# Patient Record
Sex: MALE | Race: WHITE | NOT HISPANIC OR LATINO | Employment: OTHER
[De-identification: names, ages, dates, MRNs, and addresses within clinical notes are randomized per-mention and may not be internally consistent; named-entity substitution may affect disease eponyms.]

---

## 2019-12-08 ENCOUNTER — HEALTH MAINTENANCE LETTER (OUTPATIENT)
Age: 74
End: 2019-12-08

## 2020-03-15 ENCOUNTER — HEALTH MAINTENANCE LETTER (OUTPATIENT)
Age: 75
End: 2020-03-15

## 2021-01-10 ENCOUNTER — HEALTH MAINTENANCE LETTER (OUTPATIENT)
Age: 76
End: 2021-01-10

## 2021-05-08 ENCOUNTER — HEALTH MAINTENANCE LETTER (OUTPATIENT)
Age: 76
End: 2021-05-08

## 2021-10-23 ENCOUNTER — HEALTH MAINTENANCE LETTER (OUTPATIENT)
Age: 76
End: 2021-10-23

## 2022-06-04 ENCOUNTER — HEALTH MAINTENANCE LETTER (OUTPATIENT)
Age: 77
End: 2022-06-04

## 2022-10-10 ENCOUNTER — HEALTH MAINTENANCE LETTER (OUTPATIENT)
Age: 77
End: 2022-10-10

## 2022-11-28 ENCOUNTER — OFFICE VISIT (OUTPATIENT)
Dept: CARDIOLOGY | Facility: CLINIC | Age: 77
End: 2022-11-28
Payer: MEDICARE

## 2022-11-28 VITALS
HEIGHT: 70 IN | OXYGEN SATURATION: 98 % | HEART RATE: 70 BPM | WEIGHT: 156.5 LBS | DIASTOLIC BLOOD PRESSURE: 80 MMHG | SYSTOLIC BLOOD PRESSURE: 118 MMHG | BODY MASS INDEX: 22.41 KG/M2

## 2022-11-28 DIAGNOSIS — Z13.6 SCREENING FOR CARDIOVASCULAR CONDITION: Primary | ICD-10-CM

## 2022-11-28 PROCEDURE — 93000 ELECTROCARDIOGRAM COMPLETE: CPT | Performed by: INTERNAL MEDICINE

## 2022-11-28 PROCEDURE — 99204 OFFICE O/P NEW MOD 45 MIN: CPT | Performed by: INTERNAL MEDICINE

## 2022-11-28 RX ORDER — LATANOPROST 50 UG/ML
SOLUTION/ DROPS OPHTHALMIC
COMMUNITY
Start: 2022-10-26

## 2022-11-28 RX ORDER — ASPIRIN 81 MG/1
81 TABLET, CHEWABLE ORAL DAILY
COMMUNITY

## 2022-11-28 RX ORDER — SIMVASTATIN 40 MG
40 TABLET ORAL DAILY
COMMUNITY
Start: 2022-10-26

## 2022-11-28 RX ORDER — TRIAMTERENE AND HYDROCHLOROTHIAZIDE 37.5; 25 MG/1; MG/1
CAPSULE ORAL
COMMUNITY
Start: 2022-11-17 | End: 2024-04-18

## 2022-11-28 RX ORDER — ALPRAZOLAM 0.5 MG
TABLET ORAL
COMMUNITY
Start: 2022-11-18 | End: 2024-04-18

## 2022-11-28 NOTE — LETTER
11/28/2022    Willie Dunne MD  [facility] [address] [facility] [address]    RE: Corky Zacarias       Dear Colleague,     I had the pleasure of seeing Corky Zacarias in the [facility].  CARDIOLOGY CLINIC     REASON FOR VISIT: re-establish CV care, h/o CAD    PRIMARY CARE PHYSICIAN:  Willie Dunne    HISTORY OF PRESENT ILLNESS:    Corky Zacarias is a very nice 77 year old gentleman w/CAD h/o PCI diag 2009 here to re-establish CV care.  He has not been seen in cardiology clinic since 2015.  He lives in [facility] and has followed with primary care physician with [facility] there.    Usually his BP is around 135. Recently it has been higher, in the 150s range and even up to 170s. He went to the ED up [place] in [place] where he lives and reports BP was 180/90.    He gets anxious and checks his blood pressure very frequently.  When blood pressure was that high he reported jaw discomfort and he also has intermittent Bilateral wrist discomfort although that is not necessarily related to high blood pressure and he has no exertional symptoms.    His PCP started him on triamterene with hydrochlorothiazide and his blood pressure has been well controlled and subsequently he has been asymptomatic although still gets discomfort in his wrist on occasion.    PAST MEDICAL HISTORY:  1. CAD. PCI diagonal 2009  2. Dyslipidemia  3. Hypertension  4. Anxiety      MEDICATIONS:  Current Outpatient Medications   Medication     ALPRAZolam (XANAX) 0.5 MG tablet     aspirin (ASA) 81 MG chewable tablet     latanoprost (XALATAN) 0.005 % ophthalmic solution     simvastatin (ZOCOR) 40 MG tablet     triamterene-HCTZ (DYAZIDE) 37.5-25 MG capsule     No current facility-administered medications for this visit.       ALLERGIES:  Allergies   Allergen Reactions     Aspirin      Statin Drugs [Hmg-Coa-R Inhibitors]      Muscle pain     Sulfa Drugs      rash       SOCIAL HISTORY:  I have reviewed this patient's social  history and updated it with pertinent information if needed. Corky Zacarias  reports that he has never smoked. He does not have any smokeless tobacco history on file. He reports current alcohol use.     FAMILY HISTORY:  I have reviewed this patient's family history and updated it with pertinent information if needed.   Family History   Problem Relation Age of Onset     Heart Disease Mother 70        stent placed     Other Cancer Father         REVIEW OF SYSTEMS:  Skin:        Eyes:       ENT:       Respiratory:  Negative    Cardiovascular:    Positive for;palpitations;fatigue;edema  Gastroenterology:      Genitourinary:       Musculoskeletal:  Positive for muscular weakness;neck pain  Neurologic:       Psychiatric:       Heme/Lymph/Imm:       Endocrine:         PHYSICAL EXAM:      BP: 118/80 Pulse: 70     SpO2: 98 %      Vital Signs with Ranges  Pulse:  [70] 70  BP: (118)/(80) 118/80  SpO2:  [98 %] 98 %  156 lbs 8 oz    Constitutional: awake, alert, no distress  Eyes: sclera nonicteric  ENT: trachea midline  Respiratory: Clear to auscultation bilaterally  Cardiovascular: Regular rate and rhythm no murmur appreciated no rub or gallop   GI: nondistended, nontender, bowel sounds present  Skin: dry, no rash no edema  Musculoskeletal: grossly normal muscle bulk and tone  Neuropsychiatric: Normal affect      DATA:   LAST CHOLESTEROL:  Labs reviewed in care everywhere LDL cholesterol on 11/18/2022 was 88  Basic metabolic panel on 11/14/2022 showed normal renal function and electrolytes with a sodium 141 potassium of 3.9 and a creatinine of 1.0.  Lab Results   Component Value Date    CHOL 223 05/08/2015     Lab Results   Component Value Date    HDL 54 05/08/2015     Lab Results   Component Value Date     05/08/2015     Lab Results   Component Value Date    TRIG 117 05/08/2015     Lab Results   Component Value Date    CHOLHDLRATIO 4.1 05/08/2015       LAST BMP:  Lab Results   Component Value Date     09/17/2009       Lab Results   Component Value Date    POTASSIUM 4.1 09/22/2009     Lab Results   Component Value Date    CHLORIDE 104 09/17/2009     Lab Results   Component Value Date    HUMBERTO 8.9 09/17/2009     Lab Results   Component Value Date    CO2 26 09/17/2009     Lab Results   Component Value Date    BUN 14 09/22/2009     Lab Results   Component Value Date    CR 0.91 09/22/2009     Lab Results   Component Value Date     09/17/2009       LAST CBC:  Lab Results   Component Value Date    WBC 5.0 09/04/2009     Lab Results   Component Value Date    RBC 4.88 09/04/2009     Lab Results   Component Value Date    HGB 13.8 09/22/2009     Lab Results   Component Value Date    HCT 45.0 09/04/2009     Lab Results   Component Value Date    MCV 92 09/04/2009     Lab Results   Component Value Date    MCH 31.1 09/04/2009     Lab Results   Component Value Date    MCHC 33.8 09/04/2009     Lab Results   Component Value Date    RDW 13.4 09/04/2009     Lab Results   Component Value Date     09/04/2009         EKG today  Sinus  Rhythm   -Left atrial enlargement.    -Anteroseptal infarct -age undetermined.   *similar to 2015        ASSESSMENT:  1. CAD diagonal stent 2009  2. Hypertension, controlled.  3. History of angina symptoms with hypertension.  4. Dyslipidemia, LDL is not quite optimal at 88 given his history of coronary disease would prefer LDL of less than 70 however he reports he has tried numerous cholesterol medications and prefers to remain on his current dose of simvastatin monotherapy in addition to therapeutic lifestyle.    RECOMMENDATIONS:  1. Continue current medications  2. We will obtain an updated echocardiogram given his recent probable anginal symptoms and diagnosis of hypertension.  3. At this time he does not have angina and has very good activity tolerance and stress test is not indicated but if he develops exertional angina or anginal equivalent or unstable angina would consider stress testing versus  angiography.  He is leaving soon to go to [place] for the winter and will try to get his echo done prior to leaving, otherwise he will do his echo when he returns.  4. Continue to follow lipids and hypertension with PCP.  Recommend full lipid panel at next check.  5. Follow-up in 1 year or sooner if needed.    Alva Baron MD [facility]  Text Page       Thank you for allowing me to participate in the care of your patient.      Sincerely,     Alva Baron MD     [facility]  cc:   Referred Self,

## 2022-11-28 NOTE — PROGRESS NOTES
CARDIOLOGY CLINIC     REASON FOR VISIT: re-establish CV care, h/o CAD    PRIMARY CARE PHYSICIAN:  Willie Dunne    HISTORY OF PRESENT ILLNESS:    Corky Zacarias is a very nice 77 year old gentleman w/CAD h/o PCI diag 2009 here to re-establish CV care.  He has not been seen in cardiology clinic since 2015.  He lives in [facility] and has followed with primary care physician with [facility] there.    Usually his BP is around 135. Recently it has been higher, in the 150s range and even up to 170s. He went to the ED up [place] in [place] where he lives and reports BP was 180/90.    He gets anxious and checks his blood pressure very frequently.  When blood pressure was that high he reported jaw discomfort and he also has intermittent Bilateral wrist discomfort although that is not necessarily related to high blood pressure and he has no exertional symptoms.    His PCP started him on triamterene with hydrochlorothiazide and his blood pressure has been well controlled and subsequently he has been asymptomatic although still gets discomfort in his wrist on occasion.    PAST MEDICAL HISTORY:  1. CAD. PCI diagonal 2009  2. Dyslipidemia  3. Hypertension  4. Anxiety      MEDICATIONS:  Current Outpatient Medications   Medication     ALPRAZolam (XANAX) 0.5 MG tablet     aspirin (ASA) 81 MG chewable tablet     latanoprost (XALATAN) 0.005 % ophthalmic solution     simvastatin (ZOCOR) 40 MG tablet     triamterene-HCTZ (DYAZIDE) 37.5-25 MG capsule     No current facility-administered medications for this visit.       ALLERGIES:  Allergies   Allergen Reactions     Aspirin      Statin Drugs [Hmg-Coa-R Inhibitors]      Muscle pain     Sulfa Drugs      rash       SOCIAL HISTORY:  I have reviewed this patient's social history and updated it with pertinent information if needed. Corky Zacarias  reports that he has never smoked. He does not have any smokeless tobacco history on file. He reports current alcohol use.     FAMILY  HISTORY:  I have reviewed this patient's family history and updated it with pertinent information if needed.   Family History   Problem Relation Age of Onset     Heart Disease Mother 70        stent placed     Other Cancer Father         REVIEW OF SYSTEMS:  Skin:        Eyes:       ENT:       Respiratory:  Negative    Cardiovascular:    Positive for;palpitations;fatigue;edema  Gastroenterology:      Genitourinary:       Musculoskeletal:  Positive for muscular weakness;neck pain  Neurologic:       Psychiatric:       Heme/Lymph/Imm:       Endocrine:         PHYSICAL EXAM:      BP: 118/80 Pulse: 70     SpO2: 98 %      Vital Signs with Ranges  Pulse:  [70] 70  BP: (118)/(80) 118/80  SpO2:  [98 %] 98 %  156 lbs 8 oz    Constitutional: awake, alert, no distress  Eyes: sclera nonicteric  ENT: trachea midline  Respiratory: Clear to auscultation bilaterally  Cardiovascular: Regular rate and rhythm no murmur appreciated no rub or gallop   GI: nondistended, nontender, bowel sounds present  Skin: dry, no rash no edema  Musculoskeletal: grossly normal muscle bulk and tone  Neuropsychiatric: Normal affect      DATA:   LAST CHOLESTEROL:  Labs reviewed in care everywhere LDL cholesterol on 11/18/2022 was 88  Basic metabolic panel on 11/14/2022 showed normal renal function and electrolytes with a sodium 141 potassium of 3.9 and a creatinine of 1.0.  Lab Results   Component Value Date    CHOL 223 05/08/2015     Lab Results   Component Value Date    HDL 54 05/08/2015     Lab Results   Component Value Date     05/08/2015     Lab Results   Component Value Date    TRIG 117 05/08/2015     Lab Results   Component Value Date    CHOLHDLRATIO 4.1 05/08/2015       LAST BMP:  Lab Results   Component Value Date     09/17/2009      Lab Results   Component Value Date    POTASSIUM 4.1 09/22/2009     Lab Results   Component Value Date    CHLORIDE 104 09/17/2009     Lab Results   Component Value Date    HUMBERTO 8.9 09/17/2009     Lab Results    Component Value Date    CO2 26 09/17/2009     Lab Results   Component Value Date    BUN 14 09/22/2009     Lab Results   Component Value Date    CR 0.91 09/22/2009     Lab Results   Component Value Date     09/17/2009       LAST CBC:  Lab Results   Component Value Date    WBC 5.0 09/04/2009     Lab Results   Component Value Date    RBC 4.88 09/04/2009     Lab Results   Component Value Date    HGB 13.8 09/22/2009     Lab Results   Component Value Date    HCT 45.0 09/04/2009     Lab Results   Component Value Date    MCV 92 09/04/2009     Lab Results   Component Value Date    MCH 31.1 09/04/2009     Lab Results   Component Value Date    MCHC 33.8 09/04/2009     Lab Results   Component Value Date    RDW 13.4 09/04/2009     Lab Results   Component Value Date     09/04/2009         EKG today  Sinus  Rhythm   -Left atrial enlargement.    -Anteroseptal infarct -age undetermined.   *similar to 2015        ASSESSMENT:  1. CAD diagonal stent 2009  2. Hypertension, controlled.  3. History of angina symptoms with hypertension.  4. Dyslipidemia, LDL is not quite optimal at 88 given his history of coronary disease would prefer LDL of less than 70 however he reports he has tried numerous cholesterol medications and prefers to remain on his current dose of simvastatin monotherapy in addition to therapeutic lifestyle.    RECOMMENDATIONS:  1. Continue current medications  2. We will obtain an updated echocardiogram given his recent probable anginal symptoms and diagnosis of hypertension.  3. At this time he does not have angina and has very good activity tolerance and stress test is not indicated but if he develops exertional angina or anginal equivalent or unstable angina would consider stress testing versus angiography.  He is leaving soon to go to [place] for the winter and will try to get his echo done prior to leaving, otherwise he will do his echo when he returns.  4. Continue to follow lipids and hypertension with  PCP.  Recommend full lipid panel at next check.  5. Follow-up in 1 year or sooner if needed.    Alva Baron MD [facility]  Text Page

## 2022-11-30 ENCOUNTER — HOSPITAL ENCOUNTER (OUTPATIENT)
Dept: CARDIOLOGY | Facility: CLINIC | Age: 77
Discharge: HOME | End: 2022-11-30
Attending: INTERNAL MEDICINE | Admitting: INTERNAL MEDICINE
Payer: MEDICARE

## 2022-11-30 DIAGNOSIS — Z13.6 SCREENING FOR CARDIOVASCULAR CONDITION: ICD-10-CM

## 2022-11-30 LAB — LVEF ECHO: NORMAL

## 2022-11-30 PROCEDURE — 93306 TTE W/DOPPLER COMPLETE: CPT | Mod: 26 | Performed by: INTERNAL MEDICINE

## 2022-11-30 PROCEDURE — 93306 TTE W/DOPPLER COMPLETE: CPT

## 2023-03-08 ENCOUNTER — TELEPHONE (OUTPATIENT)
Dept: CARDIOLOGY | Facility: CLINIC | Age: 78
End: 2023-03-08
Payer: MEDICARE

## 2023-03-08 NOTE — TELEPHONE ENCOUNTER
[facility] Call Center    Phone Message    May a detailed message be left on voicemail: yes     Reason for Call: Other: Pt would like  a call back to discuss if he needs antibacterial medication before his dental procedure in [place]      Action Taken: Message routed to:  [facility] ([facility] Cardio    Travel Screening: Not Applicable

## 2023-03-08 NOTE — TELEPHONE ENCOUNTER
Left detailed voicemail for pt regarding antibiotic prior to dental work.   Pt does not require any antibiotics prior to dental work.   Left team 1's call back number if pt has any questions or concerns.

## 2023-06-10 ENCOUNTER — HEALTH MAINTENANCE LETTER (OUTPATIENT)
Age: 78
End: 2023-06-10

## 2024-04-17 NOTE — PROGRESS NOTES
Preoperative Evaluation  [facility]  [address]  [address]  [facility] [address]  Phone: [phone]  Fax: [phone]  Primary Provider: Mata Bailey  Pre-op Performing Provider: MATA BAILEY  Apr 18, 2024       Martin is a 78 year old, presenting for the following:  New Patient (New patient to this clinic ) and Pre-Op Exam      Surgical Information  Surgery/Procedure: Colonoscopy   Surgery Location: [facility]   Surgeon: Dr. Shaw  Surgery Date: 4/29/24  Time of Surgery: 9:45 pm  Where patient plans to recover: At home with family  Fax number for surgical facility: Note does not need to be faxed, will be available electronically in Epic.    Assessment & Plan     The proposed surgical procedure is considered LOW risk.    ACP (advance care planning)      Health Care Home      Screening for malignant neoplasm of colon  Proceed with surgery at surgeon's discretion.    - EKG 12-lead complete w/read - Clinics    Pre-op exam    - EKG 12-lead complete w/read - Clinics             - No identified additional risk factors other than previously addressed    Antiplatelet or Anticoagulation Medication Instructions   - aspirin: Bleeding risk is low for this procedure and daily aspirin may be continued without modification.     Additional Medication Instructions  Patient is to take all scheduled medications on the day of surgery    Recommendation  APPROVAL GIVEN to proceed with proposed procedure, without further diagnostic evaluation.        Subjective       HPI related to upcoming procedure: Screening colonoscopy, mother had CRC, 5 year follow up    1. No - Have you ever had a heart attack or stroke?  2. Yes - Have you ever had surgery on your heart or blood vessels, such as a stent, coronary (heart) bypass, or surgery on an artery in the head, neck, heart, or legs? 1 stent placed 2009, no issues since  3. No - Do you have chest pain when you are physically active?  4. No - Do you  have a history of heart failure?  5. No - Do you currently have a cold, bronchitis, or symptoms of other respiratory (head and chest) infections?  6. No - Do you have a cough, shortness of breath, or wheezing?  7. No - Do you or anyone in your family have a history of blood clots?  8. No - Do you or anyone in your family have a serious bleeding problem, such as long-lasting bleeding after surgeries or cuts?  9. No - Have you ever had anemia or been told to take iron pills?  10. No - Have you had any abnormal blood loss such as black, tarry or bloody stools, or abnormal vaginal bleeding?  11. No - Have you ever had a blood transfusion?  12. Yes - Are you willing to have a blood transfusion if it is medically needed before, during, or after your surgery?  13. No - Have you or anyone in your family ever had problems with anesthesia (sedation for surgery)?  14. No - Do you have sleep apnea, excessive snoring, or daytime drowsiness?   15. No - Do you have any artifical heart valves or other implanted medical devices, such as a pacemaker, defibrillator, or continuous glucose monitor?  16. Yes - Do you have any artifical joints? R knee  17. No - Are you allergic to latex?      Health Care Directive  Patient does not have a Health Care Directive or Living Will: Discussed advance care planning with patient; however, patient declined at this time.    Preoperative Review of    reviewed - no record of controlled substances prescribed.      Status of Chronic Conditions:  CAD - Patient has a longstanding history of moderate-severe CAD. Patient denies recent chest pain or NTG use, denies exercise induced dyspnea or PND. Last Stress test 2008, EKG 2022.     HYPERLIPIDEMIA - Patient has a long history of significant Hyperlipidemia requiring medication for treatment with recent good control. Patient reports no problems or side effects with the medication.     Patient Active Problem List    Diagnosis Date Noted    ACP (advance  care planning) 04/18/2024     Priority: Medium    Health Care Home 04/18/2024     Priority: Medium    CAD (coronary artery disease) 05/06/2015     Priority: Medium     2009:The patient is status post previous diagonal branch artery stenting using a drug-eluting stent       Hypercholesterolemia 05/06/2015     Priority: Medium    Cervical spondylosis without myelopathy 05/06/2015     Priority: Medium    Rosacea 05/06/2015     Priority: Medium    S/P surgery for recurrent dislocation of shoulder 05/06/2015     Priority: Medium      Past Medical History:   Diagnosis Date    Hyperlipidemia LDL goal <100      Past Surgical History:   Procedure Laterality Date    CV PCI  2009    1 stent    HEMORRHOID SURGERY      INGUINAL HERNIA REPAIR      REPLACEMENT TOTAL KNEE Right     SHOULDER SURGERY Bilateral      Current Outpatient Medications   Medication Sig Dispense Refill    aspirin (ASA) 81 MG chewable tablet Take 81 mg by mouth daily      dorzolamide-timolol (COSOPT) 2-0.5 % ophthalmic solution       latanoprost (XALATAN) 0.005 % ophthalmic solution PLACE 1 DROP INTO BOTH EYES EVERY NIGHT      simvastatin (ZOCOR) 40 MG tablet Take 40 mg by mouth daily         Allergies   Allergen Reactions    Aspirin     Statin Drugs [Statins]      Muscle pain    Sulfa Antibiotics      rash        Social History     Tobacco Use    Smoking status: Never    Smokeless tobacco: Never   Substance Use Topics    Alcohol use: Yes     Family History   Problem Relation Age of Onset    Heart Disease Mother 70        stent placed    Other Cancer Father      History   Drug Use Unknown             Review of Systems  Constitutional, neuro, ENT, endocrine, pulmonary, cardiac, gastrointestinal, genitourinary, musculoskeletal, integument and psychiatric systems are negative, except as otherwise noted.    Objective    /70 (BP Location: Right arm, Patient Position: Sitting, Cuff Size: Adult Large)   Pulse 50   Temp 98  F (36.7  C) (Temporal)   Resp 20   " Ht 1.778 m (5' 10\")   Wt 68 kg (150 lb)   SpO2 98%   BMI 21.52 kg/m     Estimated body mass index is 21.52 kg/m  as calculated from the following:    Height as of this encounter: 1.778 m (5' 10\").    Weight as of this encounter: 68 kg (150 lb).  Physical Exam  GENERAL: alert and no distress  NECK: no adenopathy, no asymmetry, masses, or scars  RESP: lungs clear to auscultation - no rales, rhonchi or wheezes  CV: regular rate and rhythm, normal S1 S2, no S3 or S4, no murmur, click or rub, no peripheral edema  ABDOMEN: soft, nontender, no hepatosplenomegaly, no masses and bowel sounds normal  MS: no gross musculoskeletal defects noted, no edema  NEURO: Normal strength and tone, mentation intact and speech normal  PSYCH: mentation appears normal, affect normal/bright    No results for input(s): \"HGB\", \"PLT\", \"INR\", \"NA\", \"POTASSIUM\", \"CR\", \"A1C\" in the last 40674 hours.     Diagnostics  No labs were ordered during this visit.   EKG: sinus bradycardia, mild LAD, normal intervals, no acute ST/T changes c/w ischemia, no LVH by voltage criteria, unchanged from previous tracings    Revised Cardiac Risk Index (RCRI)  The patient has the following serious cardiovascular risks for perioperative complications:   - No serious cardiac risks = 0 points     RCRI Interpretation: 0 points: Class I (very low risk - 0.4% complication rate)         Signed Electronically by: Mata Bailey PA-C  Copy of this evaluation report is provided to requesting physician.         "

## 2024-04-18 ENCOUNTER — OFFICE VISIT (OUTPATIENT)
Dept: FAMILY MEDICINE | Facility: CLINIC | Age: 79
End: 2024-04-18

## 2024-04-18 VITALS
HEIGHT: 70 IN | HEART RATE: 50 BPM | WEIGHT: 150 LBS | TEMPERATURE: 98 F | DIASTOLIC BLOOD PRESSURE: 70 MMHG | OXYGEN SATURATION: 98 % | RESPIRATION RATE: 20 BRPM | BODY MASS INDEX: 21.47 KG/M2 | SYSTOLIC BLOOD PRESSURE: 130 MMHG

## 2024-04-18 DIAGNOSIS — Z71.89 ACP (ADVANCE CARE PLANNING): ICD-10-CM

## 2024-04-18 DIAGNOSIS — Z01.818 PRE-OP EXAM: ICD-10-CM

## 2024-04-18 DIAGNOSIS — Z76.89 HEALTH CARE HOME: ICD-10-CM

## 2024-04-18 DIAGNOSIS — Z12.11 SCREENING FOR MALIGNANT NEOPLASM OF COLON: Primary | ICD-10-CM

## 2024-04-18 PROCEDURE — 99204 OFFICE O/P NEW MOD 45 MIN: CPT | Performed by: PHYSICIAN ASSISTANT

## 2024-04-18 PROCEDURE — 93000 ELECTROCARDIOGRAM COMPLETE: CPT | Performed by: PHYSICIAN ASSISTANT

## 2024-04-18 RX ORDER — DORZOLAMIDE HYDROCHLORIDE AND TIMOLOL MALEATE 20; 5 MG/ML; MG/ML
SOLUTION/ DROPS OPHTHALMIC
COMMUNITY
Start: 2024-01-24

## 2024-04-29 ENCOUNTER — ANESTHESIA (OUTPATIENT)
Dept: GASTROENTEROLOGY | Facility: CLINIC | Age: 79
End: 2024-04-29
Payer: MEDICARE

## 2024-04-29 ENCOUNTER — ANESTHESIA EVENT (OUTPATIENT)
Dept: GASTROENTEROLOGY | Facility: CLINIC | Age: 79
End: 2024-04-29
Payer: MEDICARE

## 2024-04-29 ENCOUNTER — HOSPITAL ENCOUNTER (OUTPATIENT)
Facility: CLINIC | Age: 79
Discharge: HOME | End: 2024-04-29
Attending: INTERNAL MEDICINE | Admitting: INTERNAL MEDICINE
Payer: MEDICARE

## 2024-04-29 VITALS
SYSTOLIC BLOOD PRESSURE: 152 MMHG | RESPIRATION RATE: 23 BRPM | DIASTOLIC BLOOD PRESSURE: 91 MMHG | OXYGEN SATURATION: 95 % | HEART RATE: 61 BPM

## 2024-04-29 LAB — COLONOSCOPY: NORMAL

## 2024-04-29 PROCEDURE — 45380 COLONOSCOPY AND BIOPSY: CPT | Mod: XU,PT | Performed by: INTERNAL MEDICINE

## 2024-04-29 PROCEDURE — 250N000009 HC RX 250: Performed by: NURSE ANESTHETIST, CERTIFIED REGISTERED

## 2024-04-29 PROCEDURE — 370N000017 HC ANESTHESIA TECHNICAL FEE, PER MIN: Performed by: INTERNAL MEDICINE

## 2024-04-29 PROCEDURE — 250N000011 HC RX IP 250 OP 636: Performed by: NURSE ANESTHETIST, CERTIFIED REGISTERED

## 2024-04-29 PROCEDURE — 258N000003 HC RX IP 258 OP 636: Performed by: NURSE ANESTHETIST, CERTIFIED REGISTERED

## 2024-04-29 PROCEDURE — 88305 TISSUE EXAM BY PATHOLOGIST: CPT | Mod: TC | Performed by: INTERNAL MEDICINE

## 2024-04-29 PROCEDURE — 45385 COLONOSCOPY W/LESION REMOVAL: CPT | Performed by: ANESTHESIOLOGY

## 2024-04-29 PROCEDURE — 99100 ANES PT EXTEME AGE<1 YR&>70: CPT | Performed by: NURSE ANESTHETIST, CERTIFIED REGISTERED

## 2024-04-29 PROCEDURE — 999N000010 HC STATISTIC ANES STAT CODE-CRNA PER MINUTE: Performed by: INTERNAL MEDICINE

## 2024-04-29 PROCEDURE — 45385 COLONOSCOPY W/LESION REMOVAL: CPT | Mod: PT | Performed by: INTERNAL MEDICINE

## 2024-04-29 PROCEDURE — 88305 TISSUE EXAM BY PATHOLOGIST: CPT | Mod: 26 | Performed by: PATHOLOGY

## 2024-04-29 PROCEDURE — 45385 COLONOSCOPY W/LESION REMOVAL: CPT | Performed by: NURSE ANESTHETIST, CERTIFIED REGISTERED

## 2024-04-29 RX ORDER — PROPOFOL 10 MG/ML
INJECTION, EMULSION INTRAVENOUS CONTINUOUS PRN
Status: DISCONTINUED | OUTPATIENT
Start: 2024-04-29 | End: 2024-04-29

## 2024-04-29 RX ORDER — LIDOCAINE HYDROCHLORIDE 20 MG/ML
INJECTION, SOLUTION INFILTRATION; PERINEURAL PRN
Status: DISCONTINUED | OUTPATIENT
Start: 2024-04-29 | End: 2024-04-29

## 2024-04-29 RX ORDER — ONDANSETRON 2 MG/ML
INJECTION INTRAMUSCULAR; INTRAVENOUS PRN
Status: DISCONTINUED | OUTPATIENT
Start: 2024-04-29 | End: 2024-04-29

## 2024-04-29 RX ORDER — SODIUM CHLORIDE, SODIUM LACTATE, POTASSIUM CHLORIDE, CALCIUM CHLORIDE 600; 310; 30; 20 MG/100ML; MG/100ML; MG/100ML; MG/100ML
INJECTION, SOLUTION INTRAVENOUS CONTINUOUS PRN
Status: DISCONTINUED | OUTPATIENT
Start: 2024-04-29 | End: 2024-04-29

## 2024-04-29 RX ORDER — PROPOFOL 10 MG/ML
INJECTION, EMULSION INTRAVENOUS PRN
Status: DISCONTINUED | OUTPATIENT
Start: 2024-04-29 | End: 2024-04-29

## 2024-04-29 RX ADMIN — PROPOFOL 150 MCG/KG/MIN: 10 INJECTION, EMULSION INTRAVENOUS at 10:06

## 2024-04-29 RX ADMIN — SODIUM CHLORIDE, POTASSIUM CHLORIDE, SODIUM LACTATE AND CALCIUM CHLORIDE: 600; 310; 30; 20 INJECTION, SOLUTION INTRAVENOUS at 10:06

## 2024-04-29 RX ADMIN — PROPOFOL 40 MG: 10 INJECTION, EMULSION INTRAVENOUS at 10:12

## 2024-04-29 RX ADMIN — LIDOCAINE HYDROCHLORIDE 50 MG: 20 INJECTION, SOLUTION INFILTRATION; PERINEURAL at 10:06

## 2024-04-29 RX ADMIN — ONDANSETRON 4 MG: 2 INJECTION INTRAMUSCULAR; INTRAVENOUS at 10:10

## 2024-04-29 ASSESSMENT — LIFESTYLE VARIABLES: TOBACCO_USE: 0

## 2024-04-29 ASSESSMENT — ACTIVITIES OF DAILY LIVING (ADL)
ADLS_ACUITY_SCORE: 35

## 2024-04-29 NOTE — ANESTHESIA POSTPROCEDURE EVALUATION
Patient: Corky Zacarias    Procedure: Procedure(s):  Colonoscopy  COLONOSCOPY, WITH POLYPECTOMY AND BIOPSY       Anesthesia Type:  MAC    Note:  Disposition: Outpatient   Postop Pain Control: Uneventful            Sign Out: Well controlled pain   PONV: No   Neuro/Psych: Uneventful            Sign Out: Acceptable/Baseline neuro status   Airway/Respiratory: Uneventful            Sign Out: Acceptable/Baseline resp. status   CV/Hemodynamics: Uneventful            Sign Out: Acceptable CV status   Other NRE: NONE   DID A NON-ROUTINE EVENT OCCUR? No           Last vitals:  Vitals Value Taken Time   /91 04/29/24 1100   Temp     Pulse 60 04/29/24 1104   Resp 12 04/29/24 1105   SpO2 95 % 04/29/24 1104   Vitals shown include unfiled device data.    Electronically Signed By: Mihir Luke MD  April 29, 2024  11:43 AM

## 2024-04-29 NOTE — ANESTHESIA CARE TRANSFER NOTE
Patient: Corky Zacarias    Procedure: Procedure(s):  Colonoscopy  COLONOSCOPY, WITH POLYPECTOMY AND BIOPSY       Diagnosis: Screen for colon cancer [Z12.11]  Diagnosis Additional Information: No value filed.    Anesthesia Type:   MAC     Note:    Oropharynx: oropharynx clear of all foreign objects and spontaneously breathing  Level of Consciousness: awake  Oxygen Supplementation: room air    Independent Airway: airway patency satisfactory and stable  Dentition: dentition unchanged  Vital Signs Stable: post-procedure vital signs reviewed and stable  Report to RN Given: handoff report given  Patient transferred to: Phase II (endo phase II)    Handoff Report: Identifed the Patient, Identified the Reponsible Provider, Reviewed the pertinent medical history, Discussed the surgical course, Reviewed Intra-OP anesthesia mangement and issues during anesthesia, Set expectations for post-procedure period and Allowed opportunity for questions and acknowledgement of understanding      Vitals:  Vitals Value Taken Time   BP     Temp     Pulse     Resp     SpO2         Electronically Signed By: RAMA Ward CRNA  April 29, 2024  10:32 AM

## 2024-04-29 NOTE — ANESTHESIA PREPROCEDURE EVALUATION
Anesthesia Pre-Procedure Evaluation    Patient: Corky Zacarias   MRN: [id number] : 1945        Procedure : Procedure(s):  Colonoscopy          Past Medical History:   Diagnosis Date    Hyperlipidemia LDL goal <100       Past Surgical History:   Procedure Laterality Date    CV PCI      1 stent    HEMORRHOID SURGERY      INGUINAL HERNIA REPAIR      REPLACEMENT TOTAL KNEE Right     SHOULDER SURGERY Bilateral       Allergies   Allergen Reactions    Aspirin     Statin Drugs [Statins]      Muscle pain    Sulfa Antibiotics      rash      Social History     Tobacco Use    Smoking status: Never    Smokeless tobacco: Never   Substance Use Topics    Alcohol use: Yes      Wt Readings from Last 1 Encounters:   24 68 kg (150 lb)        Anesthesia Evaluation            ROS/MED HX  ENT/Pulmonary:    (-) tobacco use and sleep apnea   Neurologic:       Cardiovascular:     (+) Dyslipidemia - -  CAD -  - stent-. 1                                Previous cardiac testing   Echo: Date: 22 Results:  Interpretation Summary     There is mild (1+) aortic regurgitation.  The visual ejection fraction is 55-60%.  ______________________________________________________________________________  Left Ventricle  The left ventricle is normal in size. There is normal left ventricular wall  thickness. The visual ejection fraction is 55-60%. Left ventricular diastolic  function is normal.     Right Ventricle  The right ventricle is normal in structure, function and size.     Atria  Normal left atrial size. Right atrial size is normal.     Mitral Valve  The mitral valve leaflets appear normal. There is no evidence of stenosis,  fluttering, or prolapse.     Tricuspid Valve  Normal tricuspid valve.     Aortic Valve  There is trivial trileaflet aortic sclerosis. There is mild (1+) aortic  regurgitation.     Pulmonic Valve  The pulmonic valve is not well seen, but is grossly normal.     Vessels  The aortic root is normal size. The  "inferior vena cava is normal.     Pericardium  There is no pericardial effusion.     Rhythm  Sinus rhythm was noted.    Stress Test:  Date: Results:    ECG Reviewed:  Date: Results:    Cath:  Date: Results:      METS/Exercise Tolerance:     Hematologic:       Musculoskeletal:   (+)  arthritis,             GI/Hepatic:    (-) GERD   Renal/Genitourinary:       Endo:       Psychiatric/Substance Use:       Infectious Disease:       Malignancy:       Other:            Physical Exam    Airway        Mallampati: II   TM distance: > 3 FB   Neck ROM: full   Mouth opening: > 3 cm    Respiratory Devices and Support         Dental       (+) Modest Abnormalities - crowns, retainers, 1 or 2 missing teeth      Cardiovascular   cardiovascular exam normal          Pulmonary   pulmonary exam normal                OUTSIDE LABS:  CBC:   Lab Results   Component Value Date    WBC 5.0 09/04/2009    HGB 13.8 09/22/2009    HGB 15.2 09/04/2009    HCT 45.0 09/04/2009     09/04/2009     BMP:   Lab Results   Component Value Date     09/17/2009     09/04/2009    POTASSIUM 4.1 09/22/2009    POTASSIUM 4.4 09/17/2009    CHLORIDE 104 09/17/2009    CHLORIDE 103 09/04/2009    CO2 26 09/17/2009    CO2 29 09/04/2009    BUN 14 09/22/2009    BUN 14.3 09/17/2009    BUN 19 09/17/2009    CR 0.91 09/22/2009    CR 1.3 09/17/2009     09/17/2009    GLC 86 09/04/2009     COAGS: No results found for: \"PTT\", \"INR\", \"FIBR\"  POC: No results found for: \"BGM\", \"HCG\", \"HCGS\"  HEPATIC:   Lab Results   Component Value Date    ALT 33 09/10/2010    AST 33 12/02/2008     OTHER:   Lab Results   Component Value Date    HUMBERTO 8.9 09/17/2009       Anesthesia Plan    ASA Status:  2    NPO Status:  NPO Appropriate    Anesthesia Type: MAC.              Consents    Anesthesia Plan(s) and associated risks, benefits, and realistic alternatives discussed. Questions answered and patient/representative(s) expressed understanding.     - Discussed:     - Discussed " with:  Patient            Postoperative Care            Comments:               Mihir Luke MD    I have reviewed the pertinent notes and labs in the chart from the past 30 days and (re)examined the patient.  Any updates or changes from those notes are reflected in this note.

## 2024-04-30 ENCOUNTER — OFFICE VISIT (OUTPATIENT)
Dept: FAMILY MEDICINE | Facility: CLINIC | Age: 79
End: 2024-04-30

## 2024-04-30 VITALS
SYSTOLIC BLOOD PRESSURE: 110 MMHG | HEIGHT: 70 IN | DIASTOLIC BLOOD PRESSURE: 64 MMHG | OXYGEN SATURATION: 95 % | HEART RATE: 62 BPM | WEIGHT: 150 LBS | BODY MASS INDEX: 21.47 KG/M2 | TEMPERATURE: 98 F | RESPIRATION RATE: 20 BRPM

## 2024-04-30 DIAGNOSIS — Z01.818 PRE-OPERATIVE GENERAL PHYSICAL EXAMINATION: Primary | ICD-10-CM

## 2024-04-30 DIAGNOSIS — M47.812 CERVICAL SPONDYLOSIS WITHOUT MYELOPATHY: ICD-10-CM

## 2024-04-30 DIAGNOSIS — M17.12 PRIMARY OSTEOARTHRITIS OF LEFT KNEE: ICD-10-CM

## 2024-04-30 DIAGNOSIS — I25.10 CORONARY ARTERY DISEASE INVOLVING NATIVE HEART WITHOUT ANGINA PECTORIS, UNSPECIFIED VESSEL OR LESION TYPE: ICD-10-CM

## 2024-04-30 LAB
BUN SERPL-MCNC: 23 MG/DL (ref 7–25)
BUN/CREATININE RATIO: 20.4 (ref 6–32)
CALCIUM SERPL-MCNC: 8.9 MG/DL (ref 8.6–10.3)
CHLORIDE SERPLBLD-SCNC: 103.7 MMOL/L (ref 98–110)
CO2 SERPL-SCNC: 30.1 MMOL/L (ref 20–32)
CREAT SERPL-MCNC: 1.13 MG/DL (ref 0.6–1.3)
ERYTHROCYTE [DISTWIDTH] IN BLOOD BY AUTOMATED COUNT: 13.2 %
GLUCOSE SERPL-MCNC: 70 MG/DL (ref 60–99)
HCT VFR BLD AUTO: 41.3 % (ref 40–53)
HEMOGLOBIN: 13.4 G/DL (ref 13.3–17.7)
MCH RBC QN AUTO: 30.6 PG (ref 26–33)
MCHC RBC AUTO-ENTMCNC: 32.4 G/DL (ref 31–36)
MCV RBC AUTO: 94.4 FL (ref 78–100)
PLATELET COUNT - QUEST: 129 10^9/L (ref 150–375)
POTASSIUM SERPL-SCNC: 3.98 MMOL/L (ref 3.5–5.3)
RBC # BLD AUTO: 4.38 10*12/L (ref 4.4–5.9)
SODIUM SERPL-SCNC: 139.7 MMOL/L (ref 135–146)
WBC # BLD AUTO: 10.9 10*9/L (ref 4–11)

## 2024-04-30 PROCEDURE — 85027 COMPLETE CBC AUTOMATED: CPT | Performed by: STUDENT IN AN ORGANIZED HEALTH CARE EDUCATION/TRAINING PROGRAM

## 2024-04-30 PROCEDURE — 36415 COLL VENOUS BLD VENIPUNCTURE: CPT | Performed by: STUDENT IN AN ORGANIZED HEALTH CARE EDUCATION/TRAINING PROGRAM

## 2024-04-30 PROCEDURE — 80048 BASIC METABOLIC PNL TOTAL CA: CPT | Performed by: STUDENT IN AN ORGANIZED HEALTH CARE EDUCATION/TRAINING PROGRAM

## 2024-04-30 PROCEDURE — 99214 OFFICE O/P EST MOD 30 MIN: CPT | Performed by: STUDENT IN AN ORGANIZED HEALTH CARE EDUCATION/TRAINING PROGRAM

## 2024-04-30 NOTE — PROGRESS NOTES
Preoperative Evaluation  [facility]  [address] [place] [address]  [address]  [facility] [address]  Phone: [phone]  Fax: [phone]  Primary Provider: Mata Bailey  Pre-op Performing Provider: JAQUELIN EUGENE  Apr 30, 2024       Martin is a 78 year old, presenting for the following:  Pre-Op Exam (DOS 5/6/24, left knee replacement being done by Dr. Carr at [facility] )      Surgical Information  Surgery/Procedure: Left knee replacement   Surgery Location: [facility]  Surgeon: Dr. Carr  Surgery Date: 5/6/24  Time of Surgery: TBD  Where patient plans to recover: At home with family  Fax number for surgical facility:     Assessment & Plan     The proposed surgical procedure is considered INTERMEDIATE risk.      ICD-10-CM    1. Pre-operative general physical examination  Z01.818 Basic Metabolic Panel (BFP)     Hemogram Platelet (BFP)     VENOUS COLLECTION      2. Coronary artery disease involving native heart without angina pectoris, unspecified vessel or lesion type  I25.10       3. Primary osteoarthritis of left knee  M17.12       4. Cervical spondylosis without myelopathy  M47.812               - No identified additional risk factors other than previously addressed    Antiplatelet or Anticoagulation Medication Instructions   - aspirin: Discontinue aspirin 7-10 days prior to procedure to reduce bleeding risk. It should be resumed postoperatively.     Additional Medication Instructions  Patient Instructions   Blood work today    Stop aspirin until after surgery     Take simvastatin on usual schedule     Call with any questions          Recommendation  APPROVAL GIVEN to proceed with proposed procedure, without further diagnostic evaluation.      Jaquelin Eugene MD, [facility]       Subjective       HPI related to upcoming procedure: planned left TKA    1. No - Have you ever had a heart attack or stroke?  2. Yes - Have you ever  had surgery on your heart or blood vessels, such as a stent, coronary (heart) bypass, or surgery on an artery in the head, neck, heart, or legs? 1 stent placed 2009, no issues since  3. No - Do you have chest pain when you are physically active?  4. No - Do you have a history of heart failure?  5. No - Do you currently have a cold, bronchitis, or symptoms of other respiratory (head and chest) infections?  6. No - Do you have a cough, shortness of breath, or wheezing?  7. No - Do you or anyone in your family have a history of blood clots?  8. No - Do you or anyone in your family have a serious bleeding problem, such as long-lasting bleeding after surgeries or cuts?  9. No - Have you ever had anemia or been told to take iron pills?  10. No - Have you had any abnormal blood loss such as black, tarry or bloody stools, or abnormal vaginal bleeding?  11. No - Have you ever had a blood transfusion?  12. Yes - Are you willing to have a blood transfusion if it is medically needed before, during, or after your surgery?  13. No - Have you or anyone in your family ever had problems with anesthesia (sedation for surgery)?  14. No - Do you have sleep apnea, excessive snoring, or daytime drowsiness?   15. No - Do you have any artifical heart valves or other implanted medical devices, such as a pacemaker, defibrillator, or continuous glucose monitor?  16. Yes - Do you have any artifical joints? R knee  17. No - Are you allergic to latex?    Health Care Directive  Patient does not have a Health Care Directive or Living Will: Discussed advance care planning with patient; however, patient declined at this time.    Status of Chronic Conditions:  See problem list for active medical problems.  Problems all longstanding and stable, except as noted/documented.  See ROS for pertinent symptoms related to these conditions.    Patient Active Problem List    Diagnosis Date Noted    ACP (advance care planning) 04/18/2024     Priority: Medium     Health Care Home 04/18/2024     Priority: Medium    CAD (coronary artery disease) 05/06/2015     Priority: Medium     2009:The patient is status post previous diagonal branch artery stenting using a drug-eluting stent       Hypercholesterolemia 05/06/2015     Priority: Medium    Cervical spondylosis without myelopathy 05/06/2015     Priority: Medium    Rosacea 05/06/2015     Priority: Medium    S/P surgery for recurrent dislocation of shoulder 05/06/2015     Priority: Medium      Past Medical History:   Diagnosis Date    Heart disease     Hyperlipidemia LDL goal <100      Past Surgical History:   Procedure Laterality Date    COLONOSCOPY N/A 4/29/2024    Procedure: Colonoscopy;  Surgeon: Jv Shaw MD;  Location:  GI    COLONOSCOPY N/A 4/29/2024    Procedure: COLONOSCOPY, WITH POLYPECTOMY AND BIOPSY;  Surgeon: Jv Shaw MD;  Location:  GI    CV PCI  2009    1 stent    HEMORRHOID SURGERY      INGUINAL HERNIA REPAIR      REPLACEMENT TOTAL KNEE Right     SHOULDER SURGERY Bilateral      Current Outpatient Medications   Medication Sig Dispense Refill    aspirin (ASA) 81 MG chewable tablet Take 81 mg by mouth daily      dorzolamide-timolol (COSOPT) 2-0.5 % ophthalmic solution       latanoprost (XALATAN) 0.005 % ophthalmic solution PLACE 1 DROP INTO BOTH EYES EVERY NIGHT      simvastatin (ZOCOR) 40 MG tablet Take 40 mg by mouth daily         Allergies   Allergen Reactions    Aspirin     Statin Drugs [Statins]      Muscle pain    Sulfa Antibiotics      rash        Social History     Tobacco Use    Smoking status: Never    Smokeless tobacco: Never   Substance Use Topics    Alcohol use: Yes     Comment: rare     Family History   Problem Relation Age of Onset    Colon Cancer Mother     Heart Disease Mother 70        stent placed    Other Cancer Father      History   Drug Use Unknown         Review of Systems  12 point ROS performed and negative for new concerns except as mentioned above     Objective    BP  "110/64 (BP Location: Right arm, Patient Position: Sitting, Cuff Size: Adult Large)   Pulse 62   Temp 98  F (36.7  C) (Temporal)   Resp 20   Ht 1.778 m (5' 10\")   Wt 68 kg (150 lb)   SpO2 95%   BMI 21.52 kg/m     Estimated body mass index is 21.52 kg/m  as calculated from the following:    Height as of this encounter: 1.778 m (5' 10\").    Weight as of this encounter: 68 kg (150 lb).  Physical Exam  GENERAL: alert and no distress  EYES: Eyes grossly normal to inspection, PERRL and conjunctivae and sclerae normal  NECK: no adenopathy, no asymmetry, masses, or scars  RESP: lungs clear to auscultation - no rales, rhonchi or wheezes  CV: regular rate and rhythm, normal S1 S2, no S3 or S4, no murmur, click or rub, no peripheral edema  MS: no gross musculoskeletal defects noted, no edema  NEURO: Normal strength and tone, mentation intact and speech normal  PSYCH: mentation appears normal, affect normal/bright        Diagnostics  Recent Results (from the past 24 hour(s))   Basic Metabolic Panel (BFP)    Collection Time: 04/30/24 12:00 AM   Result Value Ref Range    Carbon Dioxide 30.1 20 - 32 mmol/L    Creatinine 1.13 0.60 - 1.30 mg/dL    Glucose 70 60 - 99 mg/dL    Sodium 139.7 135 - 146 mmol/L    Potassium 3.98 3.5 - 5.3 mmol/L    Chloride 103.7 98 - 110 mmol/L    Urea Nitrogen 23 7 - 25 mg/dL    Calcium 8.9 8.6 - 10.3 mg/dL    BUN/Creatinine Ratio 20.4 6 - 32   Hemogram Platelet (BFP)    Collection Time: 04/30/24 11:49 AM   Result Value Ref Range    WBC 10.9 4.0 - 11 10*9/L    RBC Count 4.38 (A) 4.4 - 5.9 10*12/L    Hemoglobin 13.4 13.3 - 17.7 g/dL    Hematocrit 41.3 40.0 - 53.0 %    MCV 94.4 78 - 100 fL    MCH 30.6 26 - 33 pg    MCHC 32.4 31 - 36 g/dL    RDW 13.2 %    Platelet Count 129 (A) 150 - 375 10^9/L      EKG: sinus bradycardia, normal axis, normal intervals, no acute ST/T changes c/w ischemia, unchanged from previous tracings    Revised Cardiac Risk Index (RCRI)  The patient has the following serious " cardiovascular risks for perioperative complications:   - Coronary Artery Disease (MI, positive stress test, angina, Qs on EKG) = 1 point     RCRI Interpretation: 1 point: Class II (low risk - 0.9% complication rate)         Signed Electronically by: JAQUELIN EUGENE MD  Copy of this evaluation report is provided to requesting physician.

## 2024-04-30 NOTE — NURSING NOTE
Chief Complaint   Patient presents with    Pre-Op Exam     DOS 5/6/24, left knee replacement being done by Dr. Carr at [facility]

## 2024-04-30 NOTE — PATIENT INSTRUCTIONS
Blood work today    Stop aspirin until after surgery     Take simvastatin on usual schedule     Call with any questions

## 2024-05-03 LAB
PATH REPORT.COMMENTS IMP SPEC: NORMAL
PATH REPORT.FINAL DX SPEC: NORMAL
PATH REPORT.GROSS SPEC: NORMAL
PATH REPORT.MICROSCOPIC SPEC OTHER STN: NORMAL
PATH REPORT.RELEVANT HX SPEC: NORMAL
PHOTO IMAGE: NORMAL

## 2024-06-17 PROBLEM — Z76.89 HEALTH CARE HOME: Status: RESOLVED | Noted: 2024-04-18 | Resolved: 2024-06-17

## 2024-08-03 ENCOUNTER — HEALTH MAINTENANCE LETTER (OUTPATIENT)
Age: 79
End: 2024-08-03

## 2024-12-23 ENCOUNTER — OFFICE VISIT (OUTPATIENT)
Dept: FAMILY MEDICINE | Facility: CLINIC | Age: 79
End: 2024-12-23

## 2024-12-23 VITALS
HEART RATE: 59 BPM | WEIGHT: 161 LBS | SYSTOLIC BLOOD PRESSURE: 132 MMHG | DIASTOLIC BLOOD PRESSURE: 78 MMHG | OXYGEN SATURATION: 99 % | BODY MASS INDEX: 23.1 KG/M2

## 2024-12-23 DIAGNOSIS — Z00.00 ENCOUNTER FOR MEDICARE ANNUAL WELLNESS EXAM: ICD-10-CM

## 2024-12-23 DIAGNOSIS — M19.032 ARTHRITIS OF LEFT WRIST: ICD-10-CM

## 2024-12-23 DIAGNOSIS — Z12.5 PROSTATE CANCER SCREENING: ICD-10-CM

## 2024-12-23 DIAGNOSIS — Z13.220 LIPID SCREENING: ICD-10-CM

## 2024-12-23 DIAGNOSIS — M25.532 LEFT WRIST PAIN: Primary | ICD-10-CM

## 2024-12-23 DIAGNOSIS — Z13.1 DIABETES MELLITUS SCREENING: ICD-10-CM

## 2024-12-23 LAB
CHOLEST SERPL-MCNC: 152 MG/DL (ref 0–199)
CHOLEST/HDLC SERPL: 3 {RATIO} (ref 0–5)
HDLC SERPL-MCNC: 47 MG/DL (ref 40–150)
HEMOGLOBIN A1C: 5.5 % (ref 4–5.6)
LDLC SERPL CALC-MCNC: 76 MG/DL (ref 0–129)
TRIGL SERPL-MCNC: 144 MG/DL (ref 0–149)

## 2024-12-23 PROCEDURE — 99213 OFFICE O/P EST LOW 20 MIN: CPT | Mod: 25 | Performed by: STUDENT IN AN ORGANIZED HEALTH CARE EDUCATION/TRAINING PROGRAM

## 2024-12-23 PROCEDURE — G0439 PPPS, SUBSEQ VISIT: HCPCS | Performed by: STUDENT IN AN ORGANIZED HEALTH CARE EDUCATION/TRAINING PROGRAM

## 2024-12-23 PROCEDURE — 80061 LIPID PANEL: CPT | Performed by: STUDENT IN AN ORGANIZED HEALTH CARE EDUCATION/TRAINING PROGRAM

## 2024-12-23 PROCEDURE — 73110 X-RAY EXAM OF WRIST: CPT | Mod: LT | Performed by: STUDENT IN AN ORGANIZED HEALTH CARE EDUCATION/TRAINING PROGRAM

## 2024-12-23 PROCEDURE — 36415 COLL VENOUS BLD VENIPUNCTURE: CPT | Performed by: STUDENT IN AN ORGANIZED HEALTH CARE EDUCATION/TRAINING PROGRAM

## 2024-12-23 PROCEDURE — 83036 HEMOGLOBIN GLYCOSYLATED A1C: CPT | Performed by: STUDENT IN AN ORGANIZED HEALTH CARE EDUCATION/TRAINING PROGRAM

## 2024-12-23 NOTE — PROGRESS NOTES
ASSESSMENT & PLAN    1. Left wrist pain (Primary)  2. Arthritis of left wrist  RHD patient with chronic intermittent L wrist soreness, remote hx of traumatic injury never evaluated.  XRs obtained and reviewed with patient.   Hx and exam most c/w DJD, suspect old SL injury.   Reviewed options for treatment and/or further eval, agreed on plan below, aware surgical consult also available.     Patient Instructions   Arthritis in small wrist bones  Continue brace as needed (nighttime or with heavy activity)  Tylenol as needed for pain  Can do wrist injection for pain anytime     Blood work today    Updated pneumonia vaccine available    BP follow-up when you get back from [place]    3. Encounter for Medicare annual wellness exam  Separate note  4. Prostate cancer screening  - PSA Total ([facility])  5. Lipid screening  - Lipid Panel (BFP)  6. Diabetes mellitus screening  - HEMOGLOBIN A1C (BFP)       Favian Wills MD, [facility]      -----    SUBJECTIVE  Corky Zacarias is a/an 79 year old male who is seen for evaluation of     Nursing Notes:   Kandace Ashby, [facility]  12/23/2024 12:42 PM  Addendum  Chief Complaint   Patient presents with    Consult For     Left wrist pain for the past 2 weeks, has become better over the last couple of days, did have an injury to this wrist back in the 70's, feels that he has arthritis and has been working a lot out in the woods with his hands     Medicare Visit     Annual medicare wellness visit      Pre-visit Screening:  Immunizations:  up to date  Colonoscopy:  is up to date  Mammogram: na  Asthma Action Test/Plan:  na  PHQ9:  na  GAD7:  na  Questioned patient about current smoking habits Pt. has never smoked.  Ok to leave detailed message on voice mail for today's visit only yes, phone # [phone] (home)        The patient is seen by themselves.  The patient is Right handed    Date of Onset: initial FOOSH injury in the 70s but never sought care. No recent  trauma/mechanism but gets sore with use - has been doing a lot of yardwork outside lately  Location of Pain: left wrist  Worsened by: gripping/lifting   Treatments tried: bracing, tylenol, rest. Remote hx of OT referral seen in [place].   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      OBJECTIVE:  /78 (BP Location: Left arm, Patient Position: Sitting, Cuff Size: Adult Large)   Pulse 59   Wt 73 kg (161 lb)   SpO2 99%   BMI 23.10 kg/m     Alert, NAD  NC/AT  Sclerae anicteric  Resp nonlabored  Skin warm and dry  Speech intact. Normal gait.  Appropriate affect    L wrist no swelling or acute deformity, slightly decreased flex/ext, no focal TTP today, CMS intact      Results for orders placed or performed in visit on 12/23/24   XR Wrist Left G/E 3 Views     Status: None    Narrative    Radiologist Consultation/:   Fax:  [phone]  _____________________________________________________________________________________________________________________________________________________________________________________________________________________________________________________________________________________________________________________________________________________________________________________________________________________________________________________________________________________________________  PATIENT NAME: HONEYTATIANA MICHAEL  YOB: 1945 Age: 79 ACCESSION NUMBER: [id number]  SEX: M ORDERING PROVIDER: Favian Wills  FACILITY: [facility] PRIMARY PROVIDER:  PATIENT ID: [id number] INTERESTED PARTY:  Page 1 of  1  _________________________________________________________________________________________________________________________________________________________________________________________________________________________________________________________________________________________________________________________________________________________________________________________  EXAM: XRAY WRIST,3+VIEWS LEFT  LOCATION: [facility]  DATE: 12/23/2024  INDICATION: Lt wrist pain  COMPARISON: None.  IMPRESSION: The bones are demineralized. No acute fracture or malalignment. Mild degenerative arthritis of  the first CMC and STT joints. Possible small erosions involving the lunate and distal pole of the scaphoid,  which could be seen in the setting of an inflammatory arthropathy.  SIGNED BY: Mckenna Briseno MD 12/23/2024 3:49 PM   PSA Total ([facility])     Status: Abnormal   Result Value Ref Range    Romero PSA 4.87 (H) < OR = 4.00 ng/mL   Lipid Panel (BFP)     Status: None   Result Value Ref Range    Cholesterol 152 0 - 199 mg/dL    Triglycerides 144 0 - 149 mg/dL    HDL Cholesterol 47 40 - 150 mg/dL    LDL-C 76 0 - 129 mg/dL    Cholesterol/HDL Ratio 3 0 - 5   HEMOGLOBIN A1C (BFP)     Status: None   Result Value Ref Range    Hemoglobin A1C 5.5 4 - 5.6 %

## 2024-12-23 NOTE — PROGRESS NOTES
Corky Zacarias is a 79 year old male who presents for Medicare Annual Wellness Visit.    Current providers caring for this patient include:  Patient Care Team:  Mata Bailey PA-C as PCP - General (Family Medicine)  Alva Baron MD as MD (Cardiovascular Disease)  [facility] as Assigned PCP    Complete Medical and Social history reviewed with patient, outlined below.    Patient Active Problem List   Diagnosis    CAD (coronary artery disease)    Hypercholesterolemia    Cervical spondylosis without myelopathy    Rosacea    S/P surgery for recurrent dislocation of shoulder    ACP (advance care planning)       Past Medical History:   Diagnosis Date    Heart disease     Hyperlipidemia LDL goal <100        Past Surgical History:   Procedure Laterality Date    COLONOSCOPY N/A 4/29/2024    Procedure: Colonoscopy;  Surgeon: Jv Shaw MD;  Location:  GI    COLONOSCOPY N/A 4/29/2024    Procedure: COLONOSCOPY, WITH POLYPECTOMY AND BIOPSY;  Surgeon: Jv Shaw MD;  Location:  GI    CV PCI  2009 1 stent    HEMORRHOID SURGERY      INGUINAL HERNIA REPAIR      REPLACEMENT TOTAL KNEE Right     SHOULDER SURGERY Bilateral        Family History   Problem Relation Age of Onset    Colon Cancer Mother     Heart Disease Mother 70        stent placed    Other Cancer Father        Social History     Tobacco Use    Smoking status: Never    Smokeless tobacco: Never   Substance Use Topics    Alcohol use: Yes     Comment: rare       Diet: regular, low salt/low fat  Physical Activity: active without specific exercise program  Depression Screen:    Over the past 2 weeks, patient has felt down, depressed, or hopeless:  No    Over the past 2 weeks, patient has felt little interest or pleasure in doing things: No    Functional ability/Safety screen:  Up and go test (able to get up and walk longer than 30 seconds): Passed  Patient needs assistance with: nothing-fully independent   Patient's home has the  following possible safety concerns: none identified  Patient has concerns about his hearing:  No  Cognitive Screen  Patient repeats three objects (ball, flag, tree)      Clock drawing test:   NORMAL  Recalls three objects after 3 minutes (ball,flag,tree):                                                                                               recalls 3 objects (3 points)    Physical Exam:  /78 (BP Location: Left arm, Patient Position: Sitting, Cuff Size: Adult Large)   Pulse 59   Wt 73 kg (161 lb)   SpO2 99%   BMI 23.10 kg/m     Body mass index is 23.1 kg/m .        End of Life Planning:   Patient currently has an advanced directive: No.  I have verified the patient's ablity to prepare an advanced directive/make health care decisions.  Literature was provided to assist patient in preparing an advanced directive.    Education/Counseling:   Based on review of the above information, the following items were addressed:    Appropriate preventive services were discussed with this patient, including applicable screening as appropriate for cardiovascular disease, diabetes, osteopenia/osteoporosis, and glaucoma.  As appropriate for age/gender, discussed screening for colorectal cancer, prostate cancer, breast cancer, and cervical cancer.   Checklist reviewing preventive services available has been given to the patient.      Favian Wills MD, [facility]

## 2024-12-23 NOTE — PATIENT INSTRUCTIONS
Arthritis in small wrist bones  Continue brace as needed (nighttime or with heavy activity)  Tylenol as needed for pain  Can do wrist injection for pain anytime     Blood work today    Updated pneumonia vaccine available    BP follow-up when you get back from [place]

## 2024-12-23 NOTE — NURSING NOTE
Chief Complaint   Patient presents with    Consult For     Left wrist pain for the past 2 weeks, has become better over the last couple of days, did have an injury to this wrist back in the 70's, feels that he has arthritis and has been working a lot out in the woods with his hands     Medicare Visit     Annual medicare wellness visit      Pre-visit Screening:  Immunizations:  up to date  Colonoscopy:  is up to date  Mammogram: na  Asthma Action Test/Plan:  na  PHQ9:  na  GAD7:  na  Questioned patient about current smoking habits Pt. has never smoked.  Ok to leave detailed message on voice mail for today's visit only yes, phone # [phone] (home)

## 2024-12-24 LAB — ABBOTT PSA - QUEST: 4.87 NG/ML

## 2025-04-12 ENCOUNTER — HEALTH MAINTENANCE LETTER (OUTPATIENT)
Age: 80
End: 2025-04-12

## 2025-04-22 ENCOUNTER — OFFICE VISIT (OUTPATIENT)
Dept: FAMILY MEDICINE | Facility: CLINIC | Age: 80
End: 2025-04-22

## 2025-04-22 VITALS
SYSTOLIC BLOOD PRESSURE: 136 MMHG | HEART RATE: 60 BPM | WEIGHT: 154.6 LBS | BODY MASS INDEX: 22.18 KG/M2 | OXYGEN SATURATION: 98 % | TEMPERATURE: 97.3 F | DIASTOLIC BLOOD PRESSURE: 74 MMHG

## 2025-04-22 DIAGNOSIS — H40.053 RAISED INTRAOCULAR PRESSURE OF BOTH EYES: ICD-10-CM

## 2025-04-22 DIAGNOSIS — I25.10 CORONARY ARTERY DISEASE INVOLVING NATIVE HEART WITHOUT ANGINA PECTORIS, UNSPECIFIED VESSEL OR LESION TYPE: ICD-10-CM

## 2025-04-22 DIAGNOSIS — R97.20 ELEVATED PROSTATE SPECIFIC ANTIGEN (PSA): ICD-10-CM

## 2025-04-22 DIAGNOSIS — E78.00 HYPERCHOLESTEROLEMIA: ICD-10-CM

## 2025-04-22 DIAGNOSIS — D69.6 THROMBOCYTOPENIA: Primary | ICD-10-CM

## 2025-04-22 LAB
% GRANULOCYTES: 54.4 %
HCT VFR BLD AUTO: 43.3 % (ref 40–53)
HEMOGLOBIN: 13.6 G/DL (ref 13.3–17.7)
LYMPHOCYTES NFR BLD AUTO: 34.2 %
MCH RBC QN AUTO: 29.4 PG (ref 26–33)
MCHC RBC AUTO-ENTMCNC: 31.4 G/DL (ref 31–36)
MCV RBC AUTO: 93.6 FL (ref 78–100)
MONOCYTES NFR BLD AUTO: 11.4 %
PLATELET COUNT - QUEST: 143 10^9/L (ref 150–375)
RBC # BLD AUTO: 4.63 10*12/L (ref 4.4–5.9)
WBC # BLD AUTO: 4.3 10*9/L (ref 4–11)

## 2025-04-22 RX ORDER — LATANOPROST 50 UG/ML
SOLUTION/ DROPS OPHTHALMIC
Qty: 7.5 ML | Refills: 1 | Status: SHIPPED | OUTPATIENT
Start: 2025-04-22

## 2025-04-22 RX ORDER — SIMVASTATIN 40 MG
40 TABLET ORAL DAILY
Qty: 90 TABLET | Refills: 3 | Status: SHIPPED | OUTPATIENT
Start: 2025-04-22

## 2025-04-22 NOTE — PATIENT INSTRUCTIONS
Blood work today    Keep monitoring BP and send me readings in a few weeks    No change to medications for now

## 2025-04-22 NOTE — PROGRESS NOTES
Assessment & Plan     1. Thrombocytopenia (Primary)  Mild, asx, recheck labs  - HEMOGRAM PLATELET DIFF ([facility])    2. Coronary artery disease involving native heart without angina pectoris, unspecified vessel or lesion type  4. Hypercholesterolemia  Asx, active, continue current meds and home BP monitoring  - simvastatin (ZOCOR) 40 MG tablet; Take 1 tablet (40 mg) by mouth daily.  Dispense: 90 tablet; Refill: 3    3. Elevated prostate specific antigen (PSA)  Recheck labs  - PSA Total ([facility])    5. Raised intraocular pressure of both eyes  Continue current meds, eye clinic follow-up planned  - latanoprost (XALATAN) 0.005 % ophthalmic solution; PLACE 1 DROP INTO BOTH EYES EVERY NIGHT  Dispense: 7.5 mL; Refill: 1     Patient Instructions   Blood work today    Keep monitoring BP and send me readings in a few weeks    No change to medications for now     Reasons to follow-up sooner or seek emergent care reviewed.       Favian Wills MD, [facility]      Subjective     Corky Zacarias is a 79 year old male who presents to clinic today for the following health issues:    HPI   Chief Complaint   Patient presents with    Recheck Medication     Fasting med check/blood work       Hyperlipidemia Follow-Up  Are you regularly taking any medication or supplement to lower your cholesterol?   Yes- statin  Are you having muscle aches or other side effects that you think could be caused by your cholesterol lowering medication?  No    Blood pressure Follow-up  Do you check your blood pressure regularly outside of the clinic? occasionally   Are your blood pressures ever more than 140 on the top number (systolic) OR more   than 90 on the bottom number (diastolic), for example 140/90? No    BP Readings from Last 2 Encounters:   04/22/25 136/74   12/23/24 132/78       Vascular Disease Follow-up  How often do you take nitroglycerin? Never  Do you take an aspirin every day? Yes  Walks daily, dancing, very active with no  exertional concerns         Objective    /74 (BP Location: Right arm, Patient Position: Sitting, Cuff Size: Adult Regular)   Pulse 60   Temp 97.3  F (36.3  C) (Temporal)   Wt 70.1 kg (154 lb 9.6 oz)   SpO2 98%   BMI 22.18 kg/m    Body mass index is 22.18 kg/m .  Alert, NAD  NC/AT  Sclerae anicteric  RRR  Resp nonlabored  Skin warm and dry  No focal neuro deficits. Speech intact. Normal gait.  Appropriate affect       Labs reviewed.

## 2025-04-22 NOTE — NURSING NOTE
Chief Complaint   Patient presents with    Recheck Medication     Fasting med check/blood work      Pre-visit Screening:  Immunizations:  up to date  Colonoscopy:  is up to date  Mammogram: rhea  Asthma Action Test/Plan:  rhea  PHQ9:  phq2 given  GAD7:  na  Questioned patient about current smoking habits Pt. has never smoked.  Ok to leave detailed message on voice mail for today's visit only yes, phone # [phone] (home)

## 2025-04-23 LAB — ABBOTT PSA - QUEST: 5.94 NG/ML

## 2025-05-12 ENCOUNTER — RESULTS FOLLOW-UP (OUTPATIENT)
Dept: FAMILY MEDICINE | Facility: CLINIC | Age: 80
End: 2025-05-12

## 2025-05-14 ENCOUNTER — PATIENT OUTREACH (OUTPATIENT)
Dept: CARE COORDINATION | Facility: CLINIC | Age: 80
End: 2025-05-14
Payer: MEDICARE

## 2025-06-03 NOTE — TELEPHONE ENCOUNTER
MEDICAL RECORDS REQUEST   [facility]  [facility] Clinic  [address] [place] [address]  PHONE: [phone]  Fax: [phone]        FUTURE VISIT INFORMATION                                                   Corky ROSSY Zacarias, : 1945 scheduled for future visit at [facility]    APPOINTMENT INFORMATION:  Date: 2025  Provider:  Brandy Ortega MD  Reason for Visit/Diagnosis: ELEVATED PSA    REFERRAL INFORMATION:  Referring provider:  Favian Wills MD in [facility]      RECORDS REQUESTED FOR VISIT                                                     NOTES  STATUS/DETAILS   OFFICE NOTE from referring provider  YES, 2025 -- Favian Wills MD in [facility]   MEDICATION LIST  yes   LABS     PSA  YES     PRE-VISIT CHECKLIST      Joint diagnostic appointment coordinated correctly          (ensure right order & amount of time) Yes   RECORD COLLECTION COMPLETE Yes

## 2025-06-13 ENCOUNTER — PRE VISIT (OUTPATIENT)
Dept: UROLOGY | Facility: CLINIC | Age: 80
End: 2025-06-13
Payer: MEDICARE

## 2025-08-04 ENCOUNTER — TELEPHONE (OUTPATIENT)
Dept: UROLOGY | Facility: CLINIC | Age: 80
End: 2025-08-04
Payer: MEDICARE

## 2025-08-07 ENCOUNTER — HOSPITAL ENCOUNTER (OUTPATIENT)
Dept: GENERAL RADIOLOGY | Facility: CLINIC | Age: 80
Discharge: HOME | End: 2025-08-07
Attending: UROLOGY
Payer: COMMERCIAL

## 2025-08-07 PROCEDURE — 999N000122 MR OUTSIDE READ
